# Patient Record
Sex: FEMALE | Race: WHITE | NOT HISPANIC OR LATINO | Employment: FULL TIME
[De-identification: names, ages, dates, MRNs, and addresses within clinical notes are randomized per-mention and may not be internally consistent; named-entity substitution may affect disease eponyms.]

---

## 2017-01-13 ENCOUNTER — RECORDS - HEALTHEAST (OUTPATIENT)
Dept: ADMINISTRATIVE | Facility: OTHER | Age: 27
End: 2017-01-13

## 2017-03-08 ENCOUNTER — RECORDS - HEALTHEAST (OUTPATIENT)
Dept: ADMINISTRATIVE | Facility: OTHER | Age: 27
End: 2017-03-08

## 2019-04-19 ENCOUNTER — HOSPITAL ENCOUNTER (EMERGENCY)
Facility: CLINIC | Age: 29
Discharge: HOME | End: 2019-04-19
Attending: FAMILY MEDICINE | Admitting: FAMILY MEDICINE
Payer: COMMERCIAL

## 2019-04-19 VITALS
BODY MASS INDEX: 20.32 KG/M2 | SYSTOLIC BLOOD PRESSURE: 104 MMHG | TEMPERATURE: 98 F | OXYGEN SATURATION: 100 % | DIASTOLIC BLOOD PRESSURE: 72 MMHG | RESPIRATION RATE: 16 BRPM | HEIGHT: 64 IN | WEIGHT: 119 LBS

## 2019-04-19 DIAGNOSIS — K52.9 GASTROENTERITIS: ICD-10-CM

## 2019-04-19 PROCEDURE — 99283 EMERGENCY DEPT VISIT LOW MDM: CPT

## 2019-04-19 PROCEDURE — 99283 EMERGENCY DEPT VISIT LOW MDM: CPT | Mod: Z6 | Performed by: FAMILY MEDICINE

## 2019-04-19 ASSESSMENT — ENCOUNTER SYMPTOMS
ABDOMINAL PAIN: 1
WHEEZING: 0
COUGH: 0
CHILLS: 0
DIARRHEA: 1
SHORTNESS OF BREATH: 0
BLOOD IN STOOL: 0
PALPITATIONS: 0
SINUS PRESSURE: 0
VOMITING: 0
NAUSEA: 0
SORE THROAT: 0
HEADACHES: 0
CONSTIPATION: 0
FREQUENCY: 0
FEVER: 0
DIAPHORESIS: 0
DYSURIA: 0

## 2019-04-19 ASSESSMENT — MIFFLIN-ST. JEOR: SCORE: 1254.78

## 2019-04-19 NOTE — ED PROVIDER NOTES
History     Chief Complaint   Patient presents with     Abdominal Pain     lower abdominal pain with diarrhea started on Tuesday.       HPI  Tabitha Goldberg is a 28 year old female who presents previously healthy without underlying gastrointestinal illness and with delivery approximately 3 months ago vaginal without complication.  History of mastitis and treated with dicloxacillin approximately 1 week ago.  Also with history of prior ureterolithiasis.  Developed diarrhea on Tuesday with multiple stools at least 7-8/day that are nonbloody and without mucus.  Associated with abdominal cramping that was severe last evening and generalized and avtar. left sided without radiation into the flank.  There was no preceding vomiting.  No known spoiled food intake.  No travel history.  He has been able to maintain hydration.  She initially had a fever to 101, for the first 1-2 days and this is since resolved at had a headache associated with a fever initially.  She has not had a menstrual period since her delivery.  She is currently breast-feeding.    She denies any flank pain dysuria urgency frequency or hematuria.  Currently has no abdominal pain.  There is been no hematemesis or bilious emesis.  She has had no appendectomy cholecystectomy or other abdominal surgery.    Allergies:  Allergies   Allergen Reactions     Levaquin Shortness Of Breath     Stop breathing     Morphine Nausea and Vomiting       Problem List:    There are no active problems to display for this patient.       Past Medical History:    No past medical history on file.    Past Surgical History:    No past surgical history on file.    Family History:    No family history on file.    Social History:  Marital Status:   [2]  Social History     Tobacco Use     Smoking status: Never Smoker   Substance Use Topics     Alcohol use: No     Drug use: No        Medications:      albuterol (PROAIR HFA) 108 (90 BASE) MCG/ACT inhaler   Ondansetron (ZOFRAN ODT PO)  "  oxyCODONE-acetaminophen (PERCOCET) 5-325 MG per tablet   Tolterodine Tartrate (DETROL PO)         Review of Systems   Constitutional: Negative for chills, diaphoresis and fever.   HENT: Negative for ear pain, sinus pressure and sore throat.    Eyes: Negative for visual disturbance.   Respiratory: Negative for cough, shortness of breath and wheezing.    Cardiovascular: Negative for chest pain and palpitations.   Gastrointestinal: Positive for abdominal pain and diarrhea. Negative for blood in stool, constipation, nausea and vomiting.   Genitourinary: Negative for dysuria, frequency and urgency.   Skin: Negative for rash.   Neurological: Negative for headaches.   All other systems reviewed and are negative.      Physical Exam   BP: 103/69  Heart Rate: 57  Temp: 98  F (36.7  C)  Resp: 18  Height: 162.6 cm (5' 4\")  Weight: 54 kg (119 lb)  SpO2: 100 %      Physical Exam   Constitutional: No distress.   HENT:   Mouth/Throat: Oropharynx is clear and moist.   Eyes: Conjunctivae are normal.   Neck: Neck supple.   Cardiovascular: Normal rate, regular rhythm and normal heart sounds. Exam reveals no friction rub.   No murmur heard.  Pulmonary/Chest: Effort normal and breath sounds normal. No stridor. No respiratory distress. She has no wheezes.   Abdominal: Soft. Bowel sounds are normal. She exhibits no distension and no mass. There is no tenderness. There is no rebound and no guarding. No hernia.   Musculoskeletal: She exhibits no edema.   Neurological: She is alert. She exhibits normal muscle tone.   Skin: No rash noted. She is not diaphoretic. No pallor.       ED Course        Procedures               Critical Care time:  none               No results found for this or any previous visit (from the past 24 hour(s)).    Medications - No data to display    Assessments & Plan (with Medical Decision Making)     MDM: Tabitha Goldberg is a 28 year old female who presents with an acute gastroenteritis and status post normal " spontaneous vaginal delivery 3 months ago.  Also with mastitis 1 week ago and treated with dicloxacillin.  She had no vomiting but had onset of fever for 1-2 days followed by numerous stools per day that are nonbloody and non-mucus.  She has a benign abdomen today and she is afebrile with reassuring vital signs.  She does have a history of ureterolithiasis but no findings on exam or history suggestive of this now.    Differential diagnosis includes an acute gastroenteritis versus diverticulitis, Clostridium difficile or other more serious infection of the colon.  Her findings on examination today are benign and she notes that the odor of the stool was not significantly abnormal and therefore will not obtain C. difficile but we did discuss possibly obtaining this in the future and I have written for a future C. difficile if she chooses to have this done.    We discussed management empirically as below with follow-up with her primary provider early this coming week in case her diarrhea persists.  I have given precautions for return as below as well.  I have reviewed the nursing notes.    I have reviewed the findings, diagnosis, plan and need for follow up with the patient.          Medication List      There are no discharge medications for this visit.         Final diagnoses:   Gastroenteritis - stay hydrated with >64 oz fluid per day. use electrolyte solution (ORS, 1/2 strength gatorade).  return immed for recurrent fever, dehydration, focal severe abd pain. consider c diff testing given recent antibiotics - avtar if stool odor is foul.       4/19/2019   [facility] EMERGENCY DEPARTMENT     Chris Kitchen MD  04/19/19 [id number]

## 2019-04-19 NOTE — ED AVS SNAPSHOT
[facility] Emergency Department  [address] [address]  Phone:  [phone]  Fax:  [phone]                                    Tabitha Goldberg   MRN: [id number]    Department:  [facility] Emergency Department   Date of Visit:  4/19/2019           After Visit Summary Signature Page    I have received my discharge instructions, and my questions have been answered. I have discussed any challenges I see with this plan with the nurse or doctor.    ..........................................................................................................................................  Patient/Patient Representative Signature      ..........................................................................................................................................  Patient Representative Print Name and Relationship to Patient    ..................................................               ................................................  Date                                   Time    ..........................................................................................................................................  Reviewed by Signature/Title    ...................................................              ..............................................  Date                                               Time          22EPIC Rev 08/18

## 2019-04-19 NOTE — DISCHARGE INSTRUCTIONS
ICD-10-CM    1. Gastroenteritis K52.9     stay hydrated with >64 oz fluid per day. use electrolyte solution (ORS, 1/2 strength gatorade).  return immed for recurrent fever, dehydration, focal severe abd pain. consider c diff testing given recent antibiotics - avtar if stool odor is foul.

## 2019-05-31 ENCOUNTER — RX ONLY (RX ONLY)
Age: 29
End: 2019-05-31

## 2019-05-31 RX ORDER — AZELAIC ACID 0.15 G/G
AEROSOL, FOAM TOPICAL BID
Qty: 1 | Refills: 0 | Status: ERX

## 2019-05-31 RX ORDER — AZELAIC ACID 0.15 G/G
15% AEROSOL, FOAM TOPICAL BID
Qty: 1 | Refills: 0 | Status: ERX | COMMUNITY
Start: 2019-05-31

## 2020-02-07 ENCOUNTER — TELEPHONE (OUTPATIENT)
Dept: PEDIATRICS | Facility: CLINIC | Age: 30
End: 2020-02-07

## 2020-02-07 NOTE — TELEPHONE ENCOUNTER
"Patient contacted, informed that ultrasound order cannot be placed without an examination.  Patient verbalizes understanding, will keep the appointment scheduled and \"go from there\".      Jacquie Valadez RN    "

## 2020-02-07 NOTE — TELEPHONE ENCOUNTER
Health Call Center    Phone Message    May a detailed message be left on voicemail: yes     Reason for Call: Order(s): Other:     Patient  called and scheduled patient to be seen for hematoma on left leg from a childhood injury. Patient had a horse incident and has had issues with her leg ever since.  said patient has been in more pain lately and they would like to request Dr Machado place a US order before patient appt on 02/17. Please call patient cell or home phone number to discuss if US can be done before the appt.       Action Taken: Message routed to:  Primary Care p [id number]

## 2020-02-17 ENCOUNTER — ANCILLARY PROCEDURE (OUTPATIENT)
Dept: ULTRASOUND IMAGING | Facility: CLINIC | Age: 30
End: 2020-02-17
Attending: FAMILY MEDICINE
Payer: COMMERCIAL

## 2020-02-17 ENCOUNTER — ANCILLARY PROCEDURE (OUTPATIENT)
Dept: GENERAL RADIOLOGY | Facility: CLINIC | Age: 30
End: 2020-02-17
Attending: FAMILY MEDICINE
Payer: COMMERCIAL

## 2020-02-17 ENCOUNTER — OFFICE VISIT (OUTPATIENT)
Dept: PEDIATRICS | Facility: CLINIC | Age: 30
End: 2020-02-17
Payer: COMMERCIAL

## 2020-02-17 VITALS
BODY MASS INDEX: 20.43 KG/M2 | OXYGEN SATURATION: 100 % | DIASTOLIC BLOOD PRESSURE: 58 MMHG | HEIGHT: 64 IN | SYSTOLIC BLOOD PRESSURE: 102 MMHG | TEMPERATURE: 97.7 F | HEART RATE: 65 BPM

## 2020-02-17 DIAGNOSIS — S89.92XA INJURY OF LEFT LOWER EXTREMITY, INITIAL ENCOUNTER: ICD-10-CM

## 2020-02-17 DIAGNOSIS — S89.92XA INJURY OF LEFT LOWER EXTREMITY, INITIAL ENCOUNTER: Primary | ICD-10-CM

## 2020-02-17 PROBLEM — K90.0 CELIAC DISEASE: Status: ACTIVE | Noted: 2018-11-29

## 2020-02-17 LAB — HCG UR QL: NEGATIVE

## 2020-02-17 PROCEDURE — 99204 OFFICE O/P NEW MOD 45 MIN: CPT | Performed by: FAMILY MEDICINE

## 2020-02-17 PROCEDURE — 76882 US LMTD JT/FCL EVL NVASC XTR: CPT | Mod: LT | Performed by: STUDENT IN AN ORGANIZED HEALTH CARE EDUCATION/TRAINING PROGRAM

## 2020-02-17 PROCEDURE — 73590 X-RAY EXAM OF LOWER LEG: CPT | Mod: LT | Performed by: RADIOLOGY

## 2020-02-17 PROCEDURE — 81025 URINE PREGNANCY TEST: CPT | Performed by: FAMILY MEDICINE

## 2020-02-17 ASSESSMENT — PAIN SCALES - GENERAL: PAINLEVEL: NO PAIN (0)

## 2020-02-17 NOTE — PATIENT INSTRUCTIONS
Patient Education     What Are Migraine and Tension Headaches?    Although there are several types of headaches, migraine and tension headaches affect the most people. When you have a headache, it isn't your brain that's hurting. Your head aches because nerves in the bones, blood vessels, meninges, and muscles of your head are irritated. These irritated nerves send pain signals to the brain, which identifies where you hurt and how bad the pain is.  Talk with your healthcare provider about a treatment plan that may help relieve pain and prevent future headaches.   What causes your headache?  The actual headache process is not yet understood. Only rarely are headaches a sign of a serious medical problem such as a tumor. Headache pain may be caused by abnormal interaction between the brain and the nerves and blood vessels in the head. A previous head injury or concussion, neck pain, environmental stresses, muscle tension, anxiety, depression, fatigue, skipping meals, or certain foods and drinks may trigger headache pain.  Brain scans are rarely needed and only for certain danger sign symptoms. CT scans are associated with potential radiation effects and potential inaccurate false findings.  What is referred pain?  Headache pain can be referred pain, which is pain that has its source in one place but is felt in another. For example, pain behind the eyes may actually be caused by tense muscles in the neck and shoulders. This means that the place that hurts may not be the part of the body that needs treatment.  Is it a migraine?  Migraine is a vascular headache that causes throbbing pain felt on one (most common) or both sides (less common) of the head. You may feel nauseated or vomit. This headache may also be preceded or associated with changes in sight (like seeing spots or flashes of light), ability to speak, or sensation (aura). There are a wide variety of environmental and food-related triggers for migraines. The  "pain may last for 4 to 72 hours. Afterward, you may feel shaky for a day or so. If this is the first time you experience these symptoms, you should immediately seek medical attention because you could be having a stroke.  Is it a tension headache?  This type of headache is usually a dull ache or a sensation of pressure on both sides of the head. It may be associated with pain or tension in the neck and shoulders. Depression, anxiety, and stress can cause a tension headache. The pain may not have a definite beginning or end. It may come and go, or seem never to go away.  When to call the healthcare provider  Call your healthcare provider for headaches that happen along with any of these symptoms:    Sudden, severe headache that is different from your usual headache pain    Headache associated with fever    Sudden headache associated with stiff neck    Slurred speech    Recurring headache in children     Ongoing numbness or muscle weakness    Loss of vision    Pain following a head injury    Convulsions, or a change in mental awareness    A headache you would call \"the worst headache you've ever had\"    New headaches in a pregnant woman   Date Last Reviewed: 1/1/2018 2000-2019 [employer]. [address] [place] [id number]. All rights reserved. This information is not intended as a substitute for professional medical care. Always follow your healthcare professional's instructions.           "

## 2020-02-17 NOTE — PROGRESS NOTES
"Subjective     Tabitha Goldberg is a 29 year old female who presents to clinic today for the following health issues:    HPI   Patient here with concerns for posterior left lower leg tenderness for more than 3 months, worse over 1 month, no recent trauma per patient. She was thrown off her horse, with her horse stepping on her left leg , no fracture per patient but had resulted in an injury to her left leg with subsequent infections leading to scarring. Pain is worse with walking, exercise, running and over use, she also complains of ecchymosis and edema intermittently.      Patient Active Problem List   Diagnosis     Calculus of kidney     Celiac disease     Eating disorder     Exercise-induced asthma     Syncope and collapse     Vaginal delivery     No past surgical history on file.    Social History     Tobacco Use     Smoking status: Never Smoker   Substance Use Topics     Alcohol use: No     No family history on file.      No current outpatient medications on file.     Allergies   Allergen Reactions     Levaquin Shortness Of Breath     Stop breathing     Gluten Meal Other (See Comments)     Celiac sprue  Celiac sprue     Morphine Nausea and Vomiting     No lab results found.   BP Readings from Last 3 Encounters:   02/17/20 102/58   04/19/19 104/72   01/27/15 111/66    Wt Readings from Last 3 Encounters:   04/19/19 54 kg (119 lb)   11/08/11 58.1 kg (128 lb)                    Reviewed and updated as needed this visit by Provider         Review of Systems   ROS COMP: Constitutional, HEENT, cardiovascular, pulmonary, GI, , musculoskeletal, neuro, skin, endocrine and psych systems are negative, except as otherwise noted.      Objective    /58   Pulse 65   Temp 97.7  F (36.5  C) (Oral)   Ht 1.626 m (5' 4\")   SpO2 100%   BMI 20.43 kg/m    Body mass index is 20.43 kg/m .  Physical Exam  Musculoskeletal:      Left lower leg: She exhibits tenderness, bony tenderness and swelling. Edema present.        " Legs:         GENERAL: healthy, alert and no distress    Results for orders placed or performed in visit on 02/17/20   XR Tibia & Fibula Left 2 Views     Status: None    Narrative    2 views left tibia/fibula radiographs 2/17/2020 3:12 PM    History: Injury of left lower extremity, initial encounter    Comparison: Ankle radiograph 4/4/2011    Findings:    AP and lateral views of the left tibia/fibula were obtained.     New contour irregularity of the distal fibular shaft, proximally 5.5  cm proximal to the ankle mortise joint without associated overlying  soft tissue swelling.    Knee and ankle joints are incompletely assessed but grossly congruent.      New soft tissue calcifications lateral and posterior to the mid calf.      Impression    Impression:  1. Since 2011, new contour irregularity of the distal fibular shaft,  proximally 5.5 cm proximal to the ankle mortise joint, likely  reflecting technically age indeterminate injury, possibly chronic.  Correlate clinically for focal tenderness.  2. New soft tissue calcifications lateral and posterior to the mid  calf.    [facility]   Results for orders placed or performed in visit on 02/17/20   US Extremity Non Vascular Left     Status: None    Narrative    EXAMINATION:  US EXTREMITY NON VASCULAR LEFT 2/17/2020 3:04 PM.    COMPARISON: None available. Same date radiograph of the left lower  extremity was available for correlation.    HISTORY:  Injury of left lower extremity, initial encounter  Per chart  same date clinic note, Old horseback injury to back of leg, did a lot  of PT. Has hematoma near scar that is causing pain and on and off  swelling.  Pain has been increasing for the last month when reviewed  with ordering physician.    FINDINGS: Targeted soft tissue ultrasound by technologist of left  posterior calf in area of previous injury. There is a superficial  hypoechoic lesion with heterogeneous echogenicity and shadowing  favored to represent  calcification(s) as seen on same date  radiographs. Similar appearing, but smaller lesion about 5 mm adjacent  to lesion described above.  No associated vascularity.     Underlying soft tissue and musculature appears within normal limits by  ultrasound.  No drainable fluid collection or overlying abnormal skin  thickening.  This is all in region of well healed scar per  technologist.        Impression    IMPRESSION: Nonspecific superficial lesions posterior left calf, at  least one with calcification(s).  Findings may represent sequelae of  remote injury given clinical history.  No associated vascularity.      Clinical follow-up is advised to guide further management.  Further  evaluation with MRI could be considered given report of increasing  symptoms.    Findings discussed with Dr. Machado at 3:35PM on 2/18/2020 by Dr. Badillo.    RICO BADILLO MD   Results for orders placed or performed in visit on 02/17/20   HCG Qual, Urine ([id number])     Status: None   Result Value Ref Range    HCG Qual Urine Negative NEG^Negative             Assessment & Plan     1. Injury of left lower extremity, initial encounter  Reviewed ultrasound and X-rays with patient, possible scarring but not significant enough to cause the amount of pain she is having. We will proceed with an MRI and depending on results have her see ortho and physical therapy.  Continue with Rest the affected painful area as much as possible.  Apply ice for 15-20 minutes intermittently as needed and especially after any offending activity. Daily stretching.    - XR Tibia & Fibula Left 2 Views; Future  - US Extremity Non Vascular Left; Future         Return if symptoms worsen or fail to improve.    Nancy Machado MD  [facility]

## 2020-02-18 ENCOUNTER — TELEPHONE (OUTPATIENT)
Dept: PEDIATRICS | Facility: CLINIC | Age: 30
End: 2020-02-18

## 2020-02-18 NOTE — TELEPHONE ENCOUNTER
[facility] Call Center    Phone Message    May a detailed message be left on voicemail: yes     Reason for Call: Requesting Results   Name/type of test: Ultrasound   Date of test: Yesterday - 2/17        Action Taken: Message routed to:  Primary Care p [id number]

## 2020-02-18 NOTE — TELEPHONE ENCOUNTER
[facility] Call Center    Phone Message    May a detailed message be left on voicemail: yes     Reason for Call: Other: pt calling looking for MRI please advise with patient     Action Taken: Message routed to:  Primary Care p [id number]    Travel Screening: Not Applicable

## 2020-02-19 ENCOUNTER — TRANSFERRED RECORDS (OUTPATIENT)
Dept: HEALTH INFORMATION MANAGEMENT | Facility: CLINIC | Age: 30
End: 2020-02-19

## 2020-02-19 NOTE — TELEPHONE ENCOUNTER
Patient  called and would like patient MRI order faxed to [facility] at [phone].  would like patient to have imaging today. Please fax order as soon as possible.

## 2020-02-20 ENCOUNTER — TELEPHONE (OUTPATIENT)
Dept: PEDIATRICS | Facility: CLINIC | Age: 30
End: 2020-02-20

## 2020-02-20 DIAGNOSIS — M79.89 FAT NECROSIS: ICD-10-CM

## 2020-02-20 DIAGNOSIS — S89.92XS INJURY OF LEFT LOWER EXTREMITY, SEQUELA: Primary | ICD-10-CM

## 2020-02-20 NOTE — TELEPHONE ENCOUNTER
The imaging department alerted this writer that images archiving has been completed.     Please refer to Care Everywhere.    Heaven Smith RN, [facility]

## 2020-02-20 NOTE — TELEPHONE ENCOUNTER
Please inform patient that Dr. Machado is out of office today.  I have looked into her record.  The MRI leg has not been reported to us yet.

## 2020-02-20 NOTE — TELEPHONE ENCOUNTER
"Regarding follow up questions, patient is ok with waiting until Dr. Machado returns to clinic to advise.    Called patient and gave information per Dr. Steven.    She verbalized understanding.  She would like to ask Dr. Machado questions on next step as this was an injury from 12 years ago, her left feels \"bigger\" than it has been.      She is concerned about the change.    Please advise when able.     Heaven Smith RN, [facility]    " The mother chose not to exclusively breastfeed upon admission.

## 2020-02-20 NOTE — TELEPHONE ENCOUNTER
Routing to covering provider to please review when able.    Patient seen by Dr. Machado  2/17/20.      Heaven Smith RN, [facility]

## 2020-02-20 NOTE — TELEPHONE ENCOUNTER
Called patient and informed.    Filled out form for our Imaging Dept. To look for images being pushed.    The patient will call the [facility] to ensure they were pushed over.    Heaven Smith RN, [facility]

## 2020-02-20 NOTE — TELEPHONE ENCOUNTER
[facility] Call Center    Phone Message    May a detailed message be left on voicemail: no     Pt had MRI of left leg yesterday at [facility].  Imaging was to be pushed to [facility].  Pt checking on status of that.    Dr Machado pt.

## 2020-02-20 NOTE — TELEPHONE ENCOUNTER
The MRI has been reported to show normal bones.  There is evidence of lump under the skin of the calf suggestive of fat necrosis.  That usually seen as a traumatic injury and self resolves.

## 2020-02-21 NOTE — TELEPHONE ENCOUNTER
Called patient, unable to leave message with phone number to call back due to mailbox is full.    Heaven Smith RN, [facility]

## 2020-02-21 NOTE — TELEPHONE ENCOUNTER
Patient called back.  Gave message per Dr. Machado.  Patient agrees to plan.  Heaven Smith RN, [facility]

## 2020-04-28 ENCOUNTER — TELEPHONE (OUTPATIENT)
Dept: PEDIATRICS | Facility: CLINIC | Age: 30
End: 2020-04-28

## 2020-04-28 NOTE — TELEPHONE ENCOUNTER
[facility] Call Center    Phone Message    May a detailed message be left on voicemail: yes     Reason for Call: Other: Ramiro from [facility] imaging is requesting a call back to discuss the urgecy of the MRI order. If this MRI is essential or can wait until after July 6. Please call [phone] to discuss with imaging.      Action Taken: Message routed to:  Primary Care p [id number]    Travel Screening: Not Applicable

## 2020-04-28 NOTE — TELEPHONE ENCOUNTER
Patient had an MRI left sometime 2/2020 at the [facility]. Last note states ortho referral.

## 2020-07-02 ENCOUNTER — OFFICE VISIT (OUTPATIENT)
Dept: PEDIATRICS | Facility: CLINIC | Age: 30
End: 2020-07-02
Payer: COMMERCIAL

## 2020-07-02 VITALS
TEMPERATURE: 98.2 F | WEIGHT: 109.5 LBS | SYSTOLIC BLOOD PRESSURE: 99 MMHG | OXYGEN SATURATION: 100 % | BODY MASS INDEX: 18.8 KG/M2 | HEART RATE: 71 BPM | DIASTOLIC BLOOD PRESSURE: 50 MMHG

## 2020-07-02 DIAGNOSIS — M62.830 SPASM OF THORACIC BACK MUSCLE: Primary | ICD-10-CM

## 2020-07-02 PROCEDURE — 99213 OFFICE O/P EST LOW 20 MIN: CPT | Performed by: NURSE PRACTITIONER

## 2020-07-02 RX ORDER — CYCLOBENZAPRINE HCL 10 MG
10 TABLET ORAL 3 TIMES DAILY PRN
Qty: 30 TABLET | Refills: 1 | Status: SHIPPED | OUTPATIENT
Start: 2020-07-02

## 2020-07-02 RX ORDER — VIT C/HESPERIDIN/BIOFLAVONOIDS 500-100 MG
1 TABLET ORAL DAILY
COMMUNITY

## 2020-07-02 NOTE — PROGRESS NOTES
Subjective     Tabitha Goldberg is a 29 year old female who presents to clinic today for the following health issues:    HPI   Concern - spot on right mid back  Onset: 4-5 days    Description:   Linear spot on the bra line with some itching, redness.    Intensity: mild    Progression of Symptoms:  improving    Accompanying Signs & Symptoms:   redness, itching    Previous history of similar problem:   Had tumor removed in that area when she was 8    Precipitating factors:   Worsened by: rubbing against something, flexing arms    Alleviating factors:  Improved by: not touching area    Therapies Tried and outcome: none  Started 4 days ago where this is a spot on back that is sore and also itchy   Did have a benign lesion removed in that same area about 22 years ago   Today it felt sore and achey   Does have a 3 yo and 2 yo at home and she does a lot of lifting   3 yo close to 40 lb and 2 yo about   Movement will aggravate the pain   Is in the upper back pain on her right     Patient Active Problem List   Diagnosis     Calculus of kidney     Celiac disease     Eating disorder     Exercise-induced asthma     Syncope and collapse     Vaginal delivery     History reviewed. No pertinent surgical history.    Social History     Tobacco Use     Smoking status: Never Smoker     Smokeless tobacco: Never Used   Substance Use Topics     Alcohol use: No     History reviewed. No pertinent family history.      Current Outpatient Medications   Medication Sig Dispense Refill     Ascorbic Acid (VITAMIN C PO) Take 1 tablet by mouth daily       Omega-3 Fatty Acids (FISH OIL PO) Take 1 capsule by mouth daily       VITAMIN D PO Take 1 tablet by mouth daily       Zinc 30 MG TABS Take 1 tablet by mouth daily       Allergies   Allergen Reactions     Levaquin Shortness Of Breath     Stop breathing     Gluten Meal Other (See Comments)     Celiac sprue  Celiac sprue     Morphine Nausea and Vomiting     BP Readings from Last 3 Encounters:    07/02/20 99/50   02/17/20 102/58   04/19/19 104/72    Wt Readings from Last 3 Encounters:   07/02/20 49.7 kg (109 lb 8 oz)   04/19/19 54 kg (119 lb)   11/08/11 58.1 kg (128 lb)            Reviewed and updated as needed this visit by Provider         Review of Systems   Constitutional, HEENT, cardiovascular, pulmonary, gi and gu systems are negative, except as otherwise noted.      Objective    BP 99/50 (BP Location: Right arm, Patient Position: Sitting, Cuff Size: Adult Regular)   Pulse 71   Temp 98.2  F (36.8  C) (Temporal)   Wt 49.7 kg (109 lb 8 oz)   LMP 07/02/2020   SpO2 100%   Breastfeeding No   BMI 18.80 kg/m    Body mass index is 18.8 kg/m .  Physical Exam   GENERAL APPEARANCE: healthy, alert and no distress  RESP: lungs clear to auscultation - no rales, rhonchi or wheezes  CV: regular rates and rhythm and no murmur, click or rub  MS: extremities normal- no gross deformities noted  Area on back is mildly tender appears to be more consistent with a spasm in muscle   SKIN: Skin color, texture, turgor normal.   Healed scar where patient notes sensation   NEURO: Normal strength and tone, mentation intact and speech normal  PSYCH: mentation appears normal and affect normal/bright    Diagnostic Test Results:  Labs reviewed in Epic        Assessment & Plan     Tabitha was seen today for mass.    Diagnoses and all orders for this visit:    Spasm of thoracic back muscle  -     cyclobenzaprine (FLEXERIL) 10 MG tablet; Take 1 tablet (10 mg) by mouth 3 times daily as needed for muscle spasms    PLAN:   1.   Symptomatic therapy suggested: OTC ibuprofen, aleve and call prn if symptoms persist or worsen.  apply heat to affected area, apply ice to affected area  Will try muscle relaxant at bedtime   2.  Orders Placed This Encounter   Medications     Zinc 30 MG TABS     Sig: Take 1 tablet by mouth daily     Ascorbic Acid (VITAMIN C PO)     Sig: Take 1 tablet by mouth daily     VITAMIN D PO     Sig: Take 1 tablet by  mouth daily     Omega-3 Fatty Acids (FISH OIL PO)     Sig: Take 1 capsule by mouth daily     cyclobenzaprine (FLEXERIL) 10 MG tablet     Sig: Take 1 tablet (10 mg) by mouth 3 times daily as needed for muscle spasms     Dispense:  30 tablet     Refill:  1     3. Patient needs to follow up in if no improvement,or sooner if worsening of symptoms or other symptoms develop.    See Patient Instructions      No follow-ups on file.    JULIANN Hunt CNP  [facility]

## 2020-07-02 NOTE — PATIENT INSTRUCTIONS
PLAN:   1.   Symptomatic therapy suggested: OTC ibuprofen, aleve and call prn if symptoms persist or worsen.  apply heat to affected area, apply ice to affected area  Will try muscle relaxant at bedtime   2.  Orders Placed This Encounter   Medications     Zinc 30 MG TABS     Sig: Take 1 tablet by mouth daily     Ascorbic Acid (VITAMIN C PO)     Sig: Take 1 tablet by mouth daily     VITAMIN D PO     Sig: Take 1 tablet by mouth daily     Omega-3 Fatty Acids (FISH OIL PO)     Sig: Take 1 capsule by mouth daily     cyclobenzaprine (FLEXERIL) 10 MG tablet     Sig: Take 1 tablet (10 mg) by mouth 3 times daily as needed for muscle spasms     Dispense:  30 tablet     Refill:  1     3. Patient needs to follow up in if no improvement,or sooner if worsening of symptoms or other symptoms develop.          Patient Education        Patient Education        Patient Education     Muscle Spasm  A muscle spasm is a sudden tightening of the muscle you can t control. This may be caused by strain, overworking the muscle, or injury. It can also be caused by dehydration, electrolyte imbalance, diabetes, alcohol use, and certain medicines. If it goes on long enough the muscle spasm causes pain. Common areas for muscle spasm are the legs, neck, and back.  Home care    Heat, massage, and stretching will help relax muscle spasm.    When the spasm is in your arm or leg, stretch the muscle passively. To do this, have someone bend or straighten the joint above or below the muscle until you feel the stretch on the sore muscle. You can stretch the muscle actively by moving the affected body part. This will stretch the muscle that is in spasm. For example, if the spasm is in your calf, bend the ankle so your toes point upward toward your knee. This will stretch your calf muscle.    You may use over-the-counter pain medicine to control pain, unless another medicine was prescribed. If you have chronic liver or kidney disease or ever had a stomach  ulcer or gastrointestinal bleeding, talk with your healthcare provider before using these medicines.    Follow-up care  Follow up with your healthcare provider, or as advised.    When to seek medical advice  Call your healthcare provider right away if any of the following occur:    Fingers or toes become swollen, cold, blue, numb, or tingly    You develop weakness in the affected arm or leg    Pain increases and is not controlled by the above measures  Date Last Reviewed: 5/1/2018 2000-2019 [employer]. [address] [place], [place] [id number]. All rights reserved. This information is not intended as a substitute for professional medical care. Always follow your healthcare professional's instructions.

## 2020-07-02 NOTE — NURSING NOTE
"Chief Complaint   Patient presents with     Mass     spot on right side of back x 4-5 days, had a tumor removed when she was 8 years       Initial BP 99/50 (BP Location: Right arm, Patient Position: Sitting, Cuff Size: Adult Regular)   Pulse 71   Temp 98.2  F (36.8  C) (Temporal)   Wt 49.7 kg (109 lb 8 oz)   LMP 07/02/2020   SpO2 100%   Breastfeeding No   BMI 18.80 kg/m   Estimated body mass index is 18.8 kg/m  as calculated from the following:    Height as of 2/17/20: 1.626 m (5' 4\").    Weight as of this encounter: 49.7 kg (109 lb 8 oz).  Medication Reconciliation: complete      DICKSON Guo      "

## 2020-07-07 ENCOUNTER — OFFICE VISIT (OUTPATIENT)
Dept: ORTHOPEDICS | Facility: CLINIC | Age: 30
End: 2020-07-07
Payer: COMMERCIAL

## 2020-07-07 VITALS
HEIGHT: 64 IN | WEIGHT: 109 LBS | RESPIRATION RATE: 12 BRPM | DIASTOLIC BLOOD PRESSURE: 71 MMHG | HEART RATE: 94 BPM | SYSTOLIC BLOOD PRESSURE: 117 MMHG | BODY MASS INDEX: 18.61 KG/M2

## 2020-07-07 DIAGNOSIS — S86.892A LEFT MEDIAL TIBIAL STRESS SYNDROME, INITIAL ENCOUNTER: Primary | ICD-10-CM

## 2020-07-07 PROCEDURE — 99203 OFFICE O/P NEW LOW 30 MIN: CPT | Performed by: ORTHOPAEDIC SURGERY

## 2020-07-07 ASSESSMENT — MIFFLIN-ST. JEOR: SCORE: 1204.42

## 2020-07-07 NOTE — PROGRESS NOTES
Tabitha Goldberg is a 29 year old female who is seen in consultation at the request of Dr. Nancy Machado for left leg pain.  She has had pain along the anterolateral calf after running.  This was present for about 3 months over the winter.  She thinks it was associated with swelling also.  She stopped running a few months ago and pain has now gone away.  When she was running she did not experience pain during the running, but afterwards at night.  She rated her pain between 0-2 out of 10.  She also has had an old injury to the left leg when she was 17.  She was thrown from a horse and the horse stepped on her left leg.  She had no fractures but it resulted in injury to the muscle and an infection in the leg.  She was treated with physical therapy, massage, ultrasound at that time.  She thinks it recovered well because she was able to run for many years thereafter.    Recent ultrasound and MRI scan of the leg shows scarring on the posterior aspect of the leg with some areas of scarring and calcification but no sign of continued infection or muscle damage.    History reviewed. No pertinent past medical history.    History reviewed. No pertinent surgical history.    History reviewed. No pertinent family history.    Social History     Socioeconomic History     Marital status:      Spouse name: Not on file     Number of children: Not on file     Years of education: Not on file     Highest education level: Not on file   Occupational History     Not on file   Social Needs     Financial resource strain: Not on file     Food insecurity     Worry: Not on file     Inability: Not on file     Transportation needs     Medical: Not on file     Non-medical: Not on file   Tobacco Use     Smoking status: Never Smoker     Smokeless tobacco: Never Used   Substance and Sexual Activity     Alcohol use: No     Drug use: No     Sexual activity: Yes     Partners: Male     Birth control/protection: Male Surgical   Lifestyle      Physical activity     Days per week: Not on file     Minutes per session: Not on file     Stress: Not on file   Relationships     Social connections     Talks on phone: Not on file     Gets together: Not on file     Attends Restorationist service: Not on file     Active member of club or organization: Not on file     Attends meetings of clubs or organizations: Not on file     Relationship status: Not on file     Intimate partner violence     Fear of current or ex partner: Not on file     Emotionally abused: Not on file     Physically abused: Not on file     Forced sexual activity: Not on file   Other Topics Concern     Not on file   Social History Narrative     Not on file       Current Outpatient Medications   Medication Sig Dispense Refill     Ascorbic Acid (VITAMIN C PO) Take 1 tablet by mouth daily       cyclobenzaprine (FLEXERIL) 10 MG tablet Take 1 tablet (10 mg) by mouth 3 times daily as needed for muscle spasms 30 tablet 1     Omega-3 Fatty Acids (FISH OIL PO) Take 1 capsule by mouth daily       VITAMIN D PO Take 1 tablet by mouth daily       Zinc 30 MG TABS Take 1 tablet by mouth daily         Allergies   Allergen Reactions     Levaquin Shortness Of Breath     Stop breathing     Gluten Meal Other (See Comments)     Celiac sprue  Celiac sprue     Morphine Nausea and Vomiting       REVIEW OF SYSTEMS:  CONSTITUTIONAL:  NEGATIVE for fever, chills, change in weight, not feeling tired  SKIN:  NEGATIVE for worrisome rashes, no skin lumps, no skin ulcers and no non-healing wounds  EYES:  NEGATIVE for vision changes or irritation.  ENT/MOUTH:  NEGATIVE.  No hearing loss, no hoarseness, no difficulty swallowing.  RESP:  NEGATIVE. No cough or shortness of breath.  CV:  NEGATIVE for chest pain, palpitations or peripheral edema  GI:  NEGATIVE for nausea, abdominal pain, heartburn, or change in bowel habits  :  Negative. No dysuria, no hematuria  MUSCULOSKELETAL:  See HPI above  NEURO:  NEGATIVE . No headaches, no  "dizziness,  no numbness  ENDOCRINE:  NEGATIVE for temperature intolerance, skin/hair changes  HEME/ALLERGY/IMMUNE:  NEGATIVE for bleeding problems  PSYCHIATRIC:  NEGATIVE. no anxiety, no depression.      Exam:  Vitals: /71   Pulse 94   Resp 12   Ht 1.626 m (5' 4\")   Wt 49.4 kg (109 lb)   LMP 07/02/2020   BMI 18.71 kg/m    BMI= Body mass index is 18.71 kg/m .  Constitutional:  healthy, alert and no distress  Neuro: Alert and Oriented x 3, Sensation grossly WNL.  HEENT:  Atraumatic, EOMI  Neck:  Neck supple with no tenderness.  Psych: Affect normal   Respiratory: Breathing not labored.  Cardiovascular: normal peripheral pulses  Lymph: no adenopathy  Skin: No rashes,worrisome lesions or skin problems  Spine: straight, no straight leg raising pain.  Hips show full range of motion.  There is no tenderness over the sacro-iliac joints, sciatic notch, or greater trochanters.   She has full range of motion of the knees without pain.  She has scarring of the back of the left calf but is not tender there.  There is no swelling or erythema there.  She indicates the pain was over the anterior compartment of the shin.  She does not have any tenderness along the bone.  She had no tenderness over the lateral compartment.  There is no current pain at the anterior compartment.  She has good flexibility with tiptoe down.  She has adequate strength with heel and toe raises.  No pain with resisted peroneal use.    Assessment:  Left anterior compartment pain after running, but this pattern appears too delayed to be exercise-induced compartment syndrome.  Could be a variant of shinsplints.    Plan; I recommend she start back with gentle running being careful not to overdo training.  If pain returns she should try to assess if there is muscle imbalance.  If she cannot assess this we would have physical therapy referral to help with this.  "

## 2020-07-07 NOTE — LETTER
7/7/2020         RE: Tabitha Goldberg  [address]  [facility] [address]        Dear Colleague,    Thank you for referring your patient, Tabitha Goldberg, to the [facility]. Please see a copy of my visit note below.    Tabitha Goldberg is a 29 year old female who is seen in consultation at the request of Dr. Nancy Machado for left leg pain.  She has had pain along the anterolateral calf after running.  This was present for about 3 months over the winter.  She thinks it was associated with swelling also.  She stopped running a few months ago and pain has now gone away.  When she was running she did not experience pain during the running, but afterwards at night.  She rated her pain between 0-2 out of 10.  She also has had an old injury to the left leg when she was 17.  She was thrown from a horse and the horse stepped on her left leg.  She had no fractures but it resulted in injury to the muscle and an infection in the leg.  She was treated with physical therapy, massage, ultrasound at that time.  She thinks it recovered well because she was able to run for many years thereafter.    Recent ultrasound and MRI scan of the leg shows scarring on the posterior aspect of the leg with some areas of scarring and calcification but no sign of continued infection or muscle damage.    History reviewed. No pertinent past medical history.    History reviewed. No pertinent surgical history.    History reviewed. No pertinent family history.    Social History     Socioeconomic History     Marital status:      Spouse name: Not on file     Number of children: Not on file     Years of education: Not on file     Highest education level: Not on file   Occupational History     Not on file   Social Needs     Financial resource strain: Not on file     Food insecurity     Worry: Not on file     Inability: Not on file     Transportation needs     Medical: Not on file     Non-medical: Not on file   Tobacco  Use     Smoking status: Never Smoker     Smokeless tobacco: Never Used   Substance and Sexual Activity     Alcohol use: No     Drug use: No     Sexual activity: Yes     Partners: Male     Birth control/protection: Male Surgical   Lifestyle     Physical activity     Days per week: Not on file     Minutes per session: Not on file     Stress: Not on file   Relationships     Social connections     Talks on phone: Not on file     Gets together: Not on file     Attends Taoism service: Not on file     Active member of club or organization: Not on file     Attends meetings of clubs or organizations: Not on file     Relationship status: Not on file     Intimate partner violence     Fear of current or ex partner: Not on file     Emotionally abused: Not on file     Physically abused: Not on file     Forced sexual activity: Not on file   Other Topics Concern     Not on file   Social History Narrative     Not on file       Current Outpatient Medications   Medication Sig Dispense Refill     Ascorbic Acid (VITAMIN C PO) Take 1 tablet by mouth daily       cyclobenzaprine (FLEXERIL) 10 MG tablet Take 1 tablet (10 mg) by mouth 3 times daily as needed for muscle spasms 30 tablet 1     Omega-3 Fatty Acids (FISH OIL PO) Take 1 capsule by mouth daily       VITAMIN D PO Take 1 tablet by mouth daily       Zinc 30 MG TABS Take 1 tablet by mouth daily         Allergies   Allergen Reactions     Levaquin Shortness Of Breath     Stop breathing     Gluten Meal Other (See Comments)     Celiac sprue  Celiac sprue     Morphine Nausea and Vomiting       REVIEW OF SYSTEMS:  CONSTITUTIONAL:  NEGATIVE for fever, chills, change in weight, not feeling tired  SKIN:  NEGATIVE for worrisome rashes, no skin lumps, no skin ulcers and no non-healing wounds  EYES:  NEGATIVE for vision changes or irritation.  ENT/MOUTH:  NEGATIVE.  No hearing loss, no hoarseness, no difficulty swallowing.  RESP:  NEGATIVE. No cough or shortness of breath.  CV:  NEGATIVE for  "chest pain, palpitations or peripheral edema  GI:  NEGATIVE for nausea, abdominal pain, heartburn, or change in bowel habits  :  Negative. No dysuria, no hematuria  MUSCULOSKELETAL:  See HPI above  NEURO:  NEGATIVE . No headaches, no dizziness,  no numbness  ENDOCRINE:  NEGATIVE for temperature intolerance, skin/hair changes  HEME/ALLERGY/IMMUNE:  NEGATIVE for bleeding problems  PSYCHIATRIC:  NEGATIVE. no anxiety, no depression.      Exam:  Vitals: /71   Pulse 94   Resp 12   Ht 1.626 m (5' 4\")   Wt 49.4 kg (109 lb)   LMP 07/02/2020   BMI 18.71 kg/m    BMI= Body mass index is 18.71 kg/m .  Constitutional:  healthy, alert and no distress  Neuro: Alert and Oriented x 3, Sensation grossly WNL.  HEENT:  Atraumatic, EOMI  Neck:  Neck supple with no tenderness.  Psych: Affect normal   Respiratory: Breathing not labored.  Cardiovascular: normal peripheral pulses  Lymph: no adenopathy  Skin: No rashes,worrisome lesions or skin problems  Spine: straight, no straight leg raising pain.  Hips show full range of motion.  There is no tenderness over the sacro-iliac joints, sciatic notch, or greater trochanters.   She has full range of motion of the knees without pain.  She has scarring of the back of the left calf but is not tender there.  There is no swelling or erythema there.  She indicates the pain was over the anterior compartment of the shin.  She does not have any tenderness along the bone.  She had no tenderness over the lateral compartment.  There is no current pain at the anterior compartment.  She has good flexibility with tiptoe down.  She has adequate strength with heel and toe raises.  No pain with resisted peroneal use.    Assessment:  Left anterior compartment pain after running, but this pattern appears too delayed to be exercise-induced compartment syndrome.  Could be a variant of shinsplints.    Plan; I recommend she start back with gentle running being careful not to overdo training.  If pain " returns she should try to assess if there is muscle imbalance.  If she cannot assess this we would have physical therapy referral to help with this.    Again, thank you for allowing me to participate in the care of your patient.        Sincerely,        Eugene Rojo MD

## 2021-05-31 ENCOUNTER — RECORDS - HEALTHEAST (OUTPATIENT)
Dept: ADMINISTRATIVE | Facility: CLINIC | Age: 31
End: 2021-05-31

## 2021-06-02 ENCOUNTER — RECORDS - HEALTHEAST (OUTPATIENT)
Dept: ADMINISTRATIVE | Facility: CLINIC | Age: 31
End: 2021-06-02

## 2021-07-21 ENCOUNTER — TRANSFERRED RECORDS (OUTPATIENT)
Dept: HEALTH INFORMATION MANAGEMENT | Facility: CLINIC | Age: 31
End: 2021-07-21

## 2021-07-24 ENCOUNTER — ANCILLARY PROCEDURE (OUTPATIENT)
Dept: ULTRASOUND IMAGING | Facility: CLINIC | Age: 31
End: 2021-07-24
Payer: COMMERCIAL

## 2021-07-24 DIAGNOSIS — R10.84 ABDOMINAL PAIN, GENERALIZED: ICD-10-CM

## 2021-07-24 PROCEDURE — 76700 US EXAM ABDOM COMPLETE: CPT | Mod: GC | Performed by: RADIOLOGY

## 2021-08-07 ENCOUNTER — HEALTH MAINTENANCE LETTER (OUTPATIENT)
Age: 31
End: 2021-08-07

## 2021-10-02 ENCOUNTER — HEALTH MAINTENANCE LETTER (OUTPATIENT)
Age: 31
End: 2021-10-02

## 2021-10-05 ENCOUNTER — TRANSCRIBE ORDERS (OUTPATIENT)
Dept: OTHER | Age: 31
End: 2021-10-05

## 2021-10-05 ENCOUNTER — DOCUMENTATION ONLY (OUTPATIENT)
Dept: ONCOLOGY | Facility: CLINIC | Age: 31
End: 2021-10-05

## 2021-10-05 DIAGNOSIS — Z80.3 FAMILY HISTORY OF MALIGNANT NEOPLASM OF BREAST: Primary | ICD-10-CM

## 2021-10-26 ENCOUNTER — THERAPY VISIT (OUTPATIENT)
Dept: PHYSICAL THERAPY | Facility: CLINIC | Age: 31
End: 2021-10-26
Payer: COMMERCIAL

## 2021-10-26 DIAGNOSIS — R10.12 ABDOMINAL PAIN, LEFT UPPER QUADRANT: ICD-10-CM

## 2021-10-26 DIAGNOSIS — M62.08 DIASTASIS RECTI: ICD-10-CM

## 2021-10-26 PROCEDURE — 97110 THERAPEUTIC EXERCISES: CPT | Mod: GP | Performed by: PHYSICAL THERAPIST

## 2021-10-26 PROCEDURE — 97161 PT EVAL LOW COMPLEX 20 MIN: CPT | Mod: GP | Performed by: PHYSICAL THERAPIST

## 2021-10-26 NOTE — PROGRESS NOTES
Physical Therapy Initial Evaluation  Subjective:  The history is provided by the patient. No  was used.   Patient Health History  Tabitha Goldberg being seen for L abdominal pain.     Date of Onset: self referred.   Problem occurred: reports 6 months onset of L upper abdominal pain with lifting and with pressure on the L abdominal region especially after her period. She denies any changes in the pain and did have an abdominal ultrasound which was normal.   Pain is reported as 3/10 on pain scale.  General health as reported by patient is good.  Pertinent medical history includes: none.   Red flags:  None as reported by patient.  Medical allergies: other. Other medical allergies details: levoquin.   Surgeries include:  None.    Current medications:  None.    Current occupation is none.                     Therapist Generated HPI Evaluation         Type of problem:  Other (L abdomen).    This is a chronic condition.  Condition occurred with:  Insidious onset.  Where condition occurred: for unknown reasons.  Patient reports pain:  Other (L upper abdominal quadrant).  Pain is described as other (bruise feeling) and is intermittent.  Pain is the same all the time.  Since onset symptoms are unchanged.  Symptoms are exacerbated by other and running (lifting)  and relieved by nothing.  Special tests included:  Other (ultrasound).    Barriers include:  None as reported by patient.                        Objective:  System         Lumbar/SI Evaluation  ROM:  AROM Lumbar: normal    Strength: fair sitting posture- mild slouch   Lumbar Myotomes:  normal                    Lumbar Palpation:  normal      Functional Tests:      Single Leg Bridge: Left: mild hip drop on L with weight shift bridge     Right: WNL- good pelvic/trunk contro with weight shift bridgel   % of Uninvolved:                                 Pelvic Dysfunction Evaluation:        Flexibility:  normal      Abdominal Wall:  Abdominal wall pelvic:  fair TrA activation in supine, improved with verbal cues, fair trunk/pelvic stabilization with core exercise, no PT with palpation to L abdominal upper or lower quadrant, L rectus abdominus more prominent than right   Diastasis Recti:  Present (2 finger widths separation at umbilicus and 3 cm above umbilicus)                Additional History:  Delivery History:  Vaginal delivery  Number of Pregnancies: 2  Number of Live Births: 2 ( 1/2017 1/2019,                        General     ROS    Assessment/Plan:    Patient is a 31 year old female with pelvic and abdominal complaints.    Patient has the following significant findings with corresponding treatment plan.                Diagnosis 1:  Abdominal pain  Pain -  self management, education and home program  Decreased strength - therapeutic exercise, therapeutic activities and home program  Decreased function - therapeutic activities and home program  Impaired posture - neuro re-education, therapeutic activities and home program    Therapy Evaluation Codes:   1) History comprised of:   Personal factors that impact the plan of care:      None.    Comorbidity factors that impact the plan of care are:      None.     Medications impacting care: None.  2) Examination of Body Systems comprised of:   Body structures and functions that impact the plan of care:      Lumbar spine and abdomen.   Activity limitations that impact the plan of care are:      Lifting and Running.  3) Clinical presentation characteristics are:   Stable/Uncomplicated.  4) Decision-Making    Low complexity using standardized patient assessment instrument and/or measureable assessment of functional outcome.  Cumulative Therapy Evaluation is: Low complexity.    Previous and current functional limitations:  (See Goal Flow Sheet for this information)    Short term and Long term goals: (See Goal Flow Sheet for this information)     Communication ability:  Patient appears to be able to clearly communicate and  understand verbal and written communication and follow directions correctly.  Treatment Explanation - The following has been discussed with the patient:   RX ordered/plan of care  Anticipated outcomes  Possible risks and side effects  This patient would benefit from PT intervention to resume normal activities.   Rehab potential is good.    Frequency:  1 X week, once daily  Duration:  for 2 weeks then  2x month for 2 month  Discharge Plan:  Achieve all LTG.  Independent in home treatment program.  Reach maximal therapeutic benefit.    Please refer to the daily flowsheet for treatment today, total treatment time and time spent performing 1:1 timed codes.

## 2021-12-28 PROBLEM — R10.12 ABDOMINAL PAIN, LEFT UPPER QUADRANT: Status: RESOLVED | Noted: 2021-10-26 | Resolved: 2021-12-28

## 2021-12-28 PROBLEM — M62.08 DIASTASIS RECTI: Status: RESOLVED | Noted: 2021-10-26 | Resolved: 2021-12-28

## 2022-09-03 ENCOUNTER — HEALTH MAINTENANCE LETTER (OUTPATIENT)
Age: 32
End: 2022-09-03

## 2023-09-30 ENCOUNTER — HEALTH MAINTENANCE LETTER (OUTPATIENT)
Age: 33
End: 2023-09-30